# Patient Record
Sex: MALE | Race: WHITE | NOT HISPANIC OR LATINO | Employment: FULL TIME | URBAN - METROPOLITAN AREA
[De-identification: names, ages, dates, MRNs, and addresses within clinical notes are randomized per-mention and may not be internally consistent; named-entity substitution may affect disease eponyms.]

---

## 2024-05-16 ENCOUNTER — OFFICE VISIT (OUTPATIENT)
Dept: OTOLARYNGOLOGY | Facility: CLINIC | Age: 64
End: 2024-05-16
Payer: MEDICARE

## 2024-05-16 VITALS — WEIGHT: 310 LBS | HEIGHT: 75 IN | BODY MASS INDEX: 38.54 KG/M2 | TEMPERATURE: 97.4 F

## 2024-05-16 DIAGNOSIS — H61.23 BILATERAL IMPACTED CERUMEN: Primary | ICD-10-CM

## 2024-05-16 PROCEDURE — 69210 REMOVE IMPACTED EAR WAX UNI: CPT | Performed by: NURSE PRACTITIONER

## 2024-05-16 PROCEDURE — 99203 OFFICE O/P NEW LOW 30 MIN: CPT | Performed by: NURSE PRACTITIONER

## 2024-05-16 RX ORDER — HYDROCHLOROTHIAZIDE 25 MG/1
25 TABLET ORAL DAILY
COMMUNITY
Start: 2024-04-03

## 2024-05-16 RX ORDER — SILDENAFIL 100 MG/1
100 TABLET, FILM COATED ORAL DAILY PRN
COMMUNITY
Start: 2023-11-30

## 2024-05-16 RX ORDER — ROSUVASTATIN CALCIUM 5 MG/1
5 TABLET, COATED ORAL DAILY
COMMUNITY
Start: 2024-04-03

## 2024-05-16 RX ORDER — TELMISARTAN 80 MG/1
80 TABLET ORAL DAILY
COMMUNITY
Start: 2024-04-03

## 2024-05-16 NOTE — PATIENT INSTRUCTIONS
Wax care at home - avoidance of q-tips, may use cerumen softeners every one to two months.  May use baby oil to rinse ears after ear hair trimming. Hydrocortisone cream pea sized amount on finger as needed for itching in ears.

## 2024-05-16 NOTE — PROGRESS NOTES
"Ambulatory Visit  Name: Flako Machado      : 1960      MRN: [id number]  Encounter Provider: JODY Groves  Encounter Date: 2024   Encounter department: [facility] [place]    Assessment & Plan   1. Bilateral impacted cerumen      On exam noted bilateral cerumen impaction and unable to fully view tympanic membrane.  Cerumen impaction removed bilateral eac with irrigation and #5 suction, pt tolerated procedure well.  Upon removal, improved hearing and decreased clogged sensation of bilateral ears.  Discussed routine cerumen care including avoidance of q-tips, may use cerumen softeners every one to two months.  Hydrocortisone cream pea sized amount on finger as needed for itching in ears.  Encourage ongoing follow up prn to monitor for cerumen and hearing.  Audiogram if symptoms worsen.      History of Present Illness     Flako Machado is a 64 y.o. male who presents today as a new patient due to ear concerns. Concerned about cerumen in ears. Last ear cleaning at child. Hearing gradually worsening.  Bilateral ears feel blocked/clogged worse at night.  Buzzing tinnitus.  No otalgia or otorrhea.  No history of ear surgery.  No current hearing aids.        Review of Systems   Constitutional: Negative.    HENT:  Negative for congestion, ear discharge, ear pain, hearing loss, nosebleeds, postnasal drip, rhinorrhea, sinus pressure, sinus pain, sore throat, tinnitus and voice change.    Respiratory:  Negative for chest tightness and shortness of breath.    Skin:  Negative for color change.   Neurological:  Negative for dizziness, numbness and headaches.   Psychiatric/Behavioral: Negative.         Objective     Temp (!) 97.4 °F (36.3 °C) (Temporal)   Ht 6' 3\" (1.905 m)   Wt (!) 141 kg (310 lb)   BMI 38.75 kg/m²     Physical Exam  Vitals and nursing note reviewed.   Constitutional:       General: He is not in acute distress.     Appearance: He is well-developed.   HENT:      Head: Normocephalic and " atraumatic.      Right Ear: Tympanic membrane, ear canal and external ear normal. There is impacted cerumen.      Left Ear: Tympanic membrane, ear canal and external ear normal. There is impacted cerumen.      Nose: Nose normal.      Mouth/Throat:      Mouth: Mucous membranes are moist.   Cardiovascular:      Rate and Rhythm: Normal rate and regular rhythm.   Pulmonary:      Effort: Pulmonary effort is normal.   Musculoskeletal:      Cervical back: Neck supple.   Skin:     General: Skin is warm and dry.      Capillary Refill: Capillary refill takes less than 2 seconds.   Neurological:      Mental Status: He is alert.   Psychiatric:         Mood and Affect: Mood normal.       Administrative Statements       Ear cerumen removal    Date/Time: 5/16/2024 9:00 AM    Performed by: JODY Groves  Authorized by: JODY Groves  Universal Protocol:  Consent: Verbal consent obtained.  Risks and benefits: risks, benefits and alternatives were discussed  Consent given by: patient  Patient understanding: patient states understanding of the procedure being performed    Patient location:  Clinic  Procedure details:     Local anesthetic:  None    Location:  L ear and R ear    Approach:  External  Post-procedure details:     Complication:  None    Hearing quality:  Normal    Patient tolerance of procedure:  Tolerated well, no immediate complications